# Patient Record
Sex: MALE | Race: WHITE | Employment: UNEMPLOYED | URBAN - METROPOLITAN AREA
[De-identification: names, ages, dates, MRNs, and addresses within clinical notes are randomized per-mention and may not be internally consistent; named-entity substitution may affect disease eponyms.]

---

## 2020-06-20 ENCOUNTER — NURSE TRIAGE (OUTPATIENT)
Dept: NURSING | Facility: CLINIC | Age: 35
End: 2020-06-20

## 2020-06-20 NOTE — TELEPHONE ENCOUNTER
Patient reports swollen in lymph node about a week in the past 2 weeks ago and then came back recently.  Patient says lymph nodes have decreased in size but then became swollen again.  Patient reports lymph nodes in his neck, axilla and inguinal are all swollen.  Patient reports he had not bee sick at all other than seasonal allergies.  Patient reports he ate some spoiled meet this week and was sick with gastritis from Monday- Thurs.  His lymph nodes became swollen again on Thurs night.      Additional Information    Negative: Severe difficulty breathing (e.g., struggling for each breath, speaks in single words)    Negative: [1] Node is in the neck AND [2] causes difficulty breathing    Negative: [1] Node is in the neck AND [2] can't swallow fluids    Negative: Fever > 103 F (39.4 C)    Negative: [1] Lump or swelling in groin AND [2] pulsating (like heartbeat)    Negative: Patient sounds very sick or weak to the triager    Negative: [1] Single large node AND [2] size > 1 inch (2.5 cm) AND [3] fever    Negative: [1] Overlying skin is red AND [2] fever    Negative: [1] Single large node AND [2] size > 1 inch (2.5 cm) AND [3] no fever    Negative: Rapid increase in size of node over several hours    Negative: [1] Overlying skin is red AND [2] no fever    Negative: [1] Tender node in the groin AND [2] has a sore, scratch, cut or painful red area on that leg    Negative: [1] Tender node in the armpit AND [2] has a sore, scratch, cut or painful red area on that arm    Negative: [1] Tender node in the neck AND [2] also has a sore throat AND [3] minimal/no runny nose or cough    Negative: Fever present > 3 days (72 hours)    Large nodes at multiple locations    Protocols used: LYMPH NODES [id number]

## 2020-06-23 ENCOUNTER — VIRTUAL VISIT (OUTPATIENT)
Dept: FAMILY MEDICINE | Facility: CLINIC | Age: 35
End: 2020-06-23
Payer: COMMERCIAL

## 2020-06-23 DIAGNOSIS — R59.1 LYMPHADENOPATHY: Primary | ICD-10-CM

## 2020-06-23 PROCEDURE — 99203 OFFICE O/P NEW LOW 30 MIN: CPT | Mod: GT | Performed by: FAMILY MEDICINE

## 2020-06-23 NOTE — PROGRESS NOTES
"Hubert Alvarenga is a 34 year old male who is being evaluated via a billable video visit.      The patient has been notified of following:     \"This video visit will be conducted via a call between you and your physician/provider. We have found that certain health care needs can be provided without the need for an in-person physical exam.  This service lets us provide the care you need with a video conversation.  If a prescription is necessary we can send it directly to your pharmacy.  If lab work is needed we can place an order for that and you can then stop by our lab to have the test done at a later time.    Video visits are billed at different rates depending on your insurance coverage.  Please reach out to your insurance provider with any questions.    If during the course of the call the physician/provider feels a video visit is not appropriate, you will not be charged for this service.\"    Patient has given verbal consent for Video visit? Yes    Will anyone else be joining your video visit? No    Subjective     Hubert Alvarenga is a 34 year old male who presents today via video visit for the following health issues:    HPI     Patient wanting to be seen for swollen lymph nodes on left side of neck x 2 weeks,     was getting a little better but then gotten food poisoning, some diarrhea.   No fever    Also states under the left armpit can notice lymph nodes swollen      Maybe a little better today  Has been getting slowly better    Video Start Time: 12:45 PM        There is no problem list on file for this patient.    Past Surgical History:   Procedure Laterality Date     NO HISTORY OF SURGERY         Social History     Tobacco Use     Smoking status: Never Smoker     Smokeless tobacco: Never Used   Substance Use Topics     Alcohol use: Yes     Alcohol/week: 4.0 standard drinks     Types: 1 Glasses of wine, 3 Cans of beer per week     Family History   Problem Relation Age of Onset     Uterine Cancer Mother      "     No current outpatient medications on file.     Allergies not on file    Reviewed and updated as needed this visit by Provider  Tobacco  Problems  Med Hx  Soc Hx        Review of Systems   CONSTITUTIONAL: NEGATIVE for fever, chills, change in weight  ENT/MOUTH: NEGATIVE for ear, mouth and throat problems  RESP: NEGATIVE for significant cough or SOB  CV: NEGATIVE for chest pain, palpitations or peripheral edema      Objective    There were no vitals taken for this visit.  There is no height or weight on file to calculate BMI.  Physical Exam     GENERAL: Healthy, alert and no distress  EYES: Eyes grossly normal to inspection.  No discharge or erythema, or obvious scleral/conjunctival abnormalities.  RESP: No audible wheeze, cough, or visible cyanosis.  No visible retractions or increased work of breathing.    SKIN: Visible skin clear. No significant rash, abnormal pigmentation or lesions.  NEURO: Cranial nerves grossly intact.  Mentation and speech appropriate for age.  PSYCH: Mentation appears normal, affect normal/bright, judgement and insight intact, normal speech and appearance well-groomed.          Assessment & Plan       ICD-10-CM    1. Lymphadenopathy  R59.1      Lymphadenopathy with no specific inciting illness  Differential diagnosis includes low-grade infections including viral infections, distal to the site of the lymphadenopathy, or other inflammatory conditions  More significant or rare causes such as cat scratch disease, cancer, or tuberculosis are considered but seem unlikely at this time    I recommend waiting a couple of weeks to see if this resolves before doing additional testing      Frankie Gomez MD  [facility]      Video-Visit Details    Type of service:  Video Visit    Video End Time:12:58 PM    Originating Location (pt. Location): Home    Distant Location (provider location):  [facility]     Platform used for Video Visit: Ruddy    No follow-ups on file.        Frankie Gomez MD

## 2021-01-04 ENCOUNTER — HEALTH MAINTENANCE LETTER (OUTPATIENT)
Age: 36
End: 2021-01-04

## 2021-10-10 ENCOUNTER — HEALTH MAINTENANCE LETTER (OUTPATIENT)
Age: 36
End: 2021-10-10

## 2022-01-30 ENCOUNTER — HEALTH MAINTENANCE LETTER (OUTPATIENT)
Age: 37
End: 2022-01-30

## 2022-09-24 ENCOUNTER — HEALTH MAINTENANCE LETTER (OUTPATIENT)
Age: 37
End: 2022-09-24

## 2023-05-08 ENCOUNTER — HEALTH MAINTENANCE LETTER (OUTPATIENT)
Age: 38
End: 2023-05-08

## 2024-07-14 ENCOUNTER — HEALTH MAINTENANCE LETTER (OUTPATIENT)
Age: 39
End: 2024-07-14